# Patient Record
Sex: FEMALE | URBAN - METROPOLITAN AREA
[De-identification: names, ages, dates, MRNs, and addresses within clinical notes are randomized per-mention and may not be internally consistent; named-entity substitution may affect disease eponyms.]

---

## 2023-12-19 ENCOUNTER — TELEPHONE (OUTPATIENT)
Age: 33
End: 2023-12-19

## 2023-12-19 NOTE — TELEPHONE ENCOUNTER
Npt ref by Shaye Huerta, migraine without aura and without status migrainosus. Attempted to contact pt, person not available at this time.

## 2023-12-27 NOTE — TELEPHONE ENCOUNTER
Pt called back - scheduled her for NP consult with Dr. Sydney Gastelum on Mon. 3/4/24 @ 9:00 in [place]. Advised pt that we'll need a CB that Friday before to confirm her appt.

## 2024-03-04 ENCOUNTER — OFFICE VISIT (OUTPATIENT)
Dept: NEUROLOGY | Age: 34
End: 2024-03-04
Payer: COMMERCIAL

## 2024-03-04 VITALS
OXYGEN SATURATION: 99 % | WEIGHT: 228 LBS | HEART RATE: 73 BPM | BODY MASS INDEX: 31.92 KG/M2 | HEIGHT: 71 IN | SYSTOLIC BLOOD PRESSURE: 100 MMHG | DIASTOLIC BLOOD PRESSURE: 64 MMHG

## 2024-03-04 DIAGNOSIS — G43.719 INTRACTABLE CHRONIC MIGRAINE WITHOUT AURA AND WITHOUT STATUS MIGRAINOSUS: Primary | ICD-10-CM

## 2024-03-04 DIAGNOSIS — Z78.9 BREASTFEEDING (INFANT): ICD-10-CM

## 2024-03-04 PROCEDURE — G8427 DOCREV CUR MEDS BY ELIG CLIN: HCPCS | Performed by: PSYCHIATRY & NEUROLOGY

## 2024-03-04 PROCEDURE — 99204 OFFICE O/P NEW MOD 45 MIN: CPT | Performed by: PSYCHIATRY & NEUROLOGY

## 2024-03-04 PROCEDURE — 1036F TOBACCO NON-USER: CPT | Performed by: PSYCHIATRY & NEUROLOGY

## 2024-03-04 PROCEDURE — G8417 CALC BMI ABV UP PARAM F/U: HCPCS | Performed by: PSYCHIATRY & NEUROLOGY

## 2024-03-04 PROCEDURE — G8484 FLU IMMUNIZE NO ADMIN: HCPCS | Performed by: PSYCHIATRY & NEUROLOGY

## 2024-03-04 RX ORDER — IBUPROFEN 800 MG/1
800 TABLET ORAL EVERY 8 HOURS PRN
COMMUNITY
Start: 2022-04-04

## 2024-03-04 RX ORDER — PROPRANOLOL HYDROCHLORIDE 10 MG/1
10 TABLET ORAL 2 TIMES DAILY
Qty: 60 TABLET | Refills: 1 | Status: SHIPPED | OUTPATIENT
Start: 2024-03-04

## 2024-03-04 NOTE — PROGRESS NOTES
Neurology outpatient new visit    Patient name: Alma Vines      Chief Complaint:  Refractory headache.    History of present illness:  This is a 33 years old right-handed female.  The patient is here for evaluation of refractory headache.  The patient has been diagnosed with migraine headaches since teenager.  The patient described her typical headache as throbbing pain, especially on the right side with moderate to severe intensity.  The headache can last all day long.  It occurs at least 4 to 5 days a month.  The patient is currently breast-feeding.  The patient never try any migraine prophylaxis.  The patient is currently taking ibuprofen as needed.  The patient also reports significant photophobia and phonophobia with headache.  The headache also occurs with menstruation.    Past medical history:    No past medical history on file.    Past surgical history:    No past surgical history on file.     Medication:    Current Outpatient Medications   Medication Sig Dispense Refill    ibuprofen (ADVIL;MOTRIN) 800 MG tablet Take 1 tablet by mouth every 8 hours as needed (headache)      propranolol (INDERAL) 10 MG tablet Take 1 tablet by mouth 2 times daily 60 tablet 1     No current facility-administered medications for this visit.       Allergies:    Allergies as of 03/04/2024    (No Known Allergies)        Social history:     reports that she has never smoked. She has never used smokeless tobacco.     Family history:    No family history on file.     Review of system:  No chest pain, shortness of breath, palpitation, cough, fever, abdominal pain, vomiting, diarrhea, dysuria, vertigo, joint pain, change in speech/vision or new onset of weakness/numbness. Remaining as per HPI.      /64 (Site: Left Upper Arm)   Pulse 73   Ht 1.791 m (5' 10.5\")   Wt 103.4 kg (228 lb)   SpO2 99% Comment: RA  BMI 32.25 kg/m²     Neurological examination:  MENTAL STATUS: AAOx3  LANG/SPEECH: Fluent, intact naming, repetition

## 2024-04-17 RX ORDER — PROPRANOLOL HYDROCHLORIDE 10 MG/1
10 TABLET ORAL 2 TIMES DAILY
Qty: 60 TABLET | Refills: 1 | OUTPATIENT
Start: 2024-04-17

## 2024-04-30 RX ORDER — PROPRANOLOL HYDROCHLORIDE 10 MG/1
10 TABLET ORAL 2 TIMES DAILY
Qty: 60 TABLET | Refills: 1 | Status: SHIPPED | OUTPATIENT
Start: 2024-04-30

## 2024-04-30 NOTE — TELEPHONE ENCOUNTER
LOV: 3/4/24  Next appt: needs appt in 2 months from LOV. Left message asking patient to return call to office.

## 2024-04-30 NOTE — TELEPHONE ENCOUNTER
Patient returned call and is schedule for  6/10/24 @ 12:50 at [facility]    Thsi is the last day pt will have insurance coverage to get the prescription.

## 2024-05-02 RX ORDER — PROPRANOLOL HYDROCHLORIDE 10 MG/1
10 TABLET ORAL 2 TIMES DAILY
Qty: 60 TABLET | Refills: 1 | OUTPATIENT
Start: 2024-05-02

## 2024-06-10 ENCOUNTER — OFFICE VISIT (OUTPATIENT)
Dept: NEUROLOGY | Age: 34
End: 2024-06-10
Payer: COMMERCIAL

## 2024-06-10 VITALS
OXYGEN SATURATION: 98 % | DIASTOLIC BLOOD PRESSURE: 64 MMHG | BODY MASS INDEX: 30.94 KG/M2 | HEIGHT: 71 IN | HEART RATE: 66 BPM | SYSTOLIC BLOOD PRESSURE: 118 MMHG | WEIGHT: 221 LBS

## 2024-06-10 DIAGNOSIS — Z78.9 BREASTFEEDING (INFANT): ICD-10-CM

## 2024-06-10 DIAGNOSIS — G43.719 INTRACTABLE CHRONIC MIGRAINE WITHOUT AURA AND WITHOUT STATUS MIGRAINOSUS: Primary | ICD-10-CM

## 2024-06-10 PROCEDURE — 99214 OFFICE O/P EST MOD 30 MIN: CPT | Performed by: PSYCHIATRY & NEUROLOGY

## 2024-06-10 RX ORDER — IBUPROFEN 600 MG/1
600 TABLET ORAL EVERY 8 HOURS PRN
COMMUNITY
Start: 2023-10-02

## 2024-06-10 RX ORDER — PROPRANOLOL HYDROCHLORIDE 10 MG/1
10 TABLET ORAL 2 TIMES DAILY
Qty: 60 TABLET | Refills: 1 | OUTPATIENT
Start: 2024-06-10

## 2024-06-10 RX ORDER — PROPRANOLOL HYDROCHLORIDE 20 MG/1
20 TABLET ORAL 2 TIMES DAILY
Qty: 60 TABLET | Refills: 2 | Status: SHIPPED | OUTPATIENT
Start: 2024-06-10

## 2024-06-10 NOTE — PATIENT INSTRUCTIONS
YOU MUST CONFIRM YOUR APPOINTMENT 1 DAY PRIOR OR IT WILL BE CANCELLED!!   Our office will call you 3 times the day prior to your appointment in an attempt to confirm.  Please return our call ASAP or confirm your appt through [facility] no later than 3 pm the day before your appointment.  If we do not hear back from you by 3 pm to confirm, your appointment will be cancelled & someone will be added into that slot from our wait list.

## 2024-06-10 NOTE — PROGRESS NOTES
Neurology outpatient F/U visit    Patient name: Alma Vines      Chief Complaint:  Refractory headache.    History of present illness:  This is a 33 years old right-handed female.  The patient is here for evaluation of refractory headache.  The patient has been diagnosed with migraine headaches since teenager.  The patient described her typical headache as throbbing pain, especially on the right side with moderate to severe intensity.  The headache can last all day long.  It occurs at least 4 to 5 days a month.  The patient is currently breast-feeding.  The patient never try any migraine prophylaxis.  The patient is currently taking ibuprofen as needed.  The patient also reports significant photophobia and phonophobia with headache.  The headache also occurs with menstruation.    Interval History:  06/10/24: The patient is here for follow-up headache.  The patient tried propranolol 10 mg twice a day on the last visit.  The patient reports moderate improvement of her headache.  The patient still reports some severe headache but it is not that bad.  The headache occurs few times a week now.  The patient denies new focal neurological deficit at this time.  Her heart rate is at 66/min.    Past medical history:    No past medical history on file.    Past surgical history:    No past surgical history on file.     Medication:    Current Outpatient Medications   Medication Sig Dispense Refill    ibuprofen (ADVIL;MOTRIN) 600 MG tablet Take 1 tablet by mouth every 8 hours as needed for Pain      propranolol (INDERAL) 20 MG tablet Take 1 tablet by mouth 2 times daily 60 tablet 2    ibuprofen (ADVIL;MOTRIN) 800 MG tablet Take 1 tablet by mouth every 8 hours as needed (headache)       No current facility-administered medications for this visit.       Allergies:    Allergies as of 06/10/2024    (No Known Allergies)        Social history:     reports that she has never smoked. She has never used smokeless tobacco.     Family

## 2024-07-12 RX ORDER — PROPRANOLOL HYDROCHLORIDE 20 MG/1
20 TABLET ORAL 2 TIMES DAILY
Qty: 60 TABLET | Refills: 2 | Status: SHIPPED | OUTPATIENT
Start: 2024-07-12

## 2024-09-16 ENCOUNTER — TELEPHONE (OUTPATIENT)
Age: 34
End: 2024-09-16

## 2024-09-30 ENCOUNTER — PATIENT MESSAGE (OUTPATIENT)
Dept: NEUROLOGY | Age: 34
End: 2024-09-30

## 2024-10-07 ENCOUNTER — OFFICE VISIT (OUTPATIENT)
Dept: URGENT CARE | Age: 34
End: 2024-10-07

## 2024-10-07 VITALS
SYSTOLIC BLOOD PRESSURE: 111 MMHG | HEART RATE: 78 BPM | DIASTOLIC BLOOD PRESSURE: 73 MMHG | OXYGEN SATURATION: 98 % | BODY MASS INDEX: 32.5 KG/M2 | HEIGHT: 70 IN | WEIGHT: 227 LBS | TEMPERATURE: 98.1 F

## 2024-10-07 DIAGNOSIS — Z20.2 CONTACT WITH AND (SUSPECTED) EXPOSURE TO INFECTIONS WITH A PREDOMINANTLY SEXUAL MODE OF TRANSMISSION: ICD-10-CM

## 2024-10-07 DIAGNOSIS — B96.89 BACTERIAL VULVOVAGINITIS: Primary | ICD-10-CM

## 2024-10-07 DIAGNOSIS — N89.8 VAGINAL PRURITUS: ICD-10-CM

## 2024-10-07 DIAGNOSIS — N89.8 VAGINAL DISCHARGE: ICD-10-CM

## 2024-10-07 DIAGNOSIS — N76.0 BACTERIAL VULVOVAGINITIS: Primary | ICD-10-CM

## 2024-10-07 PROBLEM — O92.5 SUPPRESSED LACTATION, POSTPARTUM CONDITION: Status: ACTIVE | Noted: 2022-05-10

## 2024-10-07 PROBLEM — M67.471 GANGLION CYST OF RIGHT FOOT: Status: ACTIVE | Noted: 2023-08-16

## 2024-10-07 PROBLEM — Z91.89 AT HIGH RISK FOR BREAST CANCER: Status: ACTIVE | Noted: 2023-09-11

## 2024-10-07 PROBLEM — O92.29 SORE NIPPLES DUE TO LACTATION: Status: ACTIVE | Noted: 2022-04-13

## 2024-10-07 PROBLEM — Z30.2 ENCOUNTER FOR STERILIZATION: Status: ACTIVE | Noted: 2023-08-23

## 2024-10-07 PROBLEM — Z80.3 FAMILY HISTORY OF MALIGNANT NEOPLASM OF BREAST: Status: ACTIVE | Noted: 2023-09-11

## 2024-10-07 PROBLEM — G43.009 MIGRAINE WITHOUT AURA AND WITHOUT STATUS MIGRAINOSUS, NOT INTRACTABLE: Status: ACTIVE | Noted: 2023-01-27

## 2024-10-07 LAB
APPEARANCE FLUID: NORMAL
BILIRUBIN, POC: NORMAL
BLOOD URINE, POC: NORMAL
CLARITY, POC: CLEAR
COLOR, POC: YELLOW
GLUCOSE URINE, POC: NORMAL MG/DL
KETONES, POC: NORMAL MG/DL
LEUKOCYTE EST, POC: NORMAL
NITRITE, POC: NORMAL
PH, POC: 7
PROTEIN, POC: NORMAL MG/DL
SPECIFIC GRAVITY, POC: 1.02
UROBILINOGEN, POC: NORMAL

## 2024-10-07 RX ORDER — METRONIDAZOLE 500 MG/1
500 TABLET ORAL 2 TIMES DAILY
Qty: 14 TABLET | Refills: 0 | Status: SHIPPED | OUTPATIENT
Start: 2024-10-07 | End: 2024-10-14

## 2024-10-07 ASSESSMENT — VISUAL ACUITY: OU: 1

## 2024-10-07 NOTE — PATIENT INSTRUCTIONS
Vaginal swab sent to rule out Yeast, Bacterial Vaginosis (BV), and Trichomonas infections.  Urine sent to rule out Gonorrhea and Chlamydia.  Blood samples sent for HIV, Herpes, and Syphilis testing  We will notify you of the test results once they become available.  We will also notify you of any treatment plan changes considered necessary based on those results.  Given your current symptoms, will start treatment for suspected infection:   Oral Flagyl (metronidazole) prescribed to treat likely BV.  Take all antibiotic treatments as prescribed, and until fully completed unless otherwise directed by a health care provider.  If any of the results come back as positive, you should contact all of your current and recent sexual partners so they can get tested and obtain treatment if necessary.  You should abstain from any sexual activity for at least 2 weeks, until all of your symptoms have resolved, or until testing has ruled out infections, so as not to infect any others.  It is very important that you follow up with your regular doctor or an STI clinic for any further follow up, reevaluation, or required testing (HIV, Hepatitis, syphilis, etc) in the future.   You may also follow up with the [facility] for STI testing, call [phone] for an appointment.  Practice safer sex practices for any future sexual activities, as discussed in clinic, to decrease exposures to any future STIs.    New Prescriptions    METRONIDAZOLE (FLAGYL) 500 MG TABLET    Take 1 tablet by mouth 2 times daily for 7 days

## 2024-10-07 NOTE — PROGRESS NOTES
orders placed or performed in visit on 10/07/24   POCT Urinalysis no Micro   Result Value Ref Range    Color, UA yellow     Clarity, UA clear     Glucose, UA POC neg mg/dL    Bilirubin, UA neg     Ketones, UA neg mg/dL    Spec Grav, UA 1.020     Blood, UA POC trace     pH, UA 7.0     Protein, UA POC neg mg/dL    Urobilinogen, UA      Leukocytes, UA neg     Nitrite, UA neg     Appearance, Fluid       An electronic signature was used to authenticate this note.    --Jeffry Patel PA-C

## 2024-10-08 LAB
CANDIDA DNA VAG QL NAA+PROBE: ABNORMAL
G VAGINALIS DNA SPEC QL NAA+PROBE: ABNORMAL
HERPES SIMPLEX VIRUS 1 IGG: POSITIVE
HERPES SIMPLEX VIRUS 2 IGG: NEGATIVE
HIV 1+2 AB+HIV1 P24 AG SERPL QL IA: NORMAL
HIV 2 AB SERPL QL IA: NORMAL
HIV1 AB SERPL QL IA: NORMAL
HIV1 P24 AG SERPL QL IA: NORMAL
REAGIN+T PALLIDUM IGG+IGM SERPL-IMP: NORMAL
T VAGINALIS DNA VAG QL NAA+PROBE: ABNORMAL

## 2024-10-09 LAB
C TRACH DNA UR QL NAA+PROBE: NEGATIVE
N GONORRHOEA DNA UR QL NAA+PROBE: NEGATIVE

## 2025-01-18 ENCOUNTER — PATIENT MESSAGE (OUTPATIENT)
Dept: NEUROLOGY | Age: 35
End: 2025-01-18